# Patient Record
(demographics unavailable — no encounter records)

---

## 2025-05-15 NOTE — PLAN
[FreeTextEntry1] : 42 yo for consultation regarding fibroids.   Fibroid -Discussed the option of continued conservative observation of fibroids vs surgical removal. Treatment options of myomectomy, hysterectomy, ufe, and continued observation were also outlined. A detailed discussion regarding the option of myomectomy vs hysterectomy was also held and the risk, benefits, and alternatives provided. All questions answered and pt to notify. -rx pelvic sono and pelvic MRI -[facility] done today -Pt to follow up via telehealth to review her results

## 2025-05-15 NOTE — PLAN
[FreeTextEntry1] : 44 yo for consultation regarding fibroids.   Fibroid -Discussed the option of continued conservative observation of fibroids vs surgical removal. Treatment options of myomectomy, hysterectomy, ufe, and continued observation were also outlined. A detailed discussion regarding the option of myomectomy vs hysterectomy was also held and the risk, benefits, and alternatives provided. All questions answered and pt to notify. -rx pelvic sono and pelvic MRI -[facility] done today -Pt to follow up via telehealth to review her results

## 2025-05-15 NOTE — END OF VISIT
[FreeTextEntry3] : I Rich Bobby, acted as a scribe on behalf of Dr. Daniela Harding on 05/12/2025.  All medical entries made by this scribe where at my Dr. Daniela Harding, direction and personally dictated by me on 05/12/2025.  I have reviewed the chart and agree that the record accurately reflects my personal performance of the history, physical exam, assessment, and plan. I have also personally directed, reviewed and agreed with the chart.

## 2025-05-15 NOTE — HISTORY OF PRESENT ILLNESS
[FreeTextEntry1] : 42 yo presents for a consultation regarding fibroids which was found on a sono 3 years ago. Pt states that she has regular menses, which last 5-7 days. However, she states that she has heavy bleeding during the first 1-2 days. She states that during that time, she soaks through a pad/tampon within 2-3 hours. She denies any cramping, pain or clots, urinary frequency.   Pt also complains of feeling fatigue, and states that she saw her PCP Dr. Moon on 5/8/2025 and had a hemoglobin of a 9.9. Iron deficient, HBA1C of a 5.9. Vitamin D of 21. TSH of 1.1.   She is sexually active and denies any pain during intercourse.   On a pelvic sono done on 7/25/2024, revealed a 11 cm fibroid.   OBH: TOP x1  GYNH: Fibroids PMH: Anemia  PSH: None FH: Diabetes -  Medications: None Allergies: NKDA Social history: Works as an .